# Patient Record
Sex: MALE | Race: ASIAN
[De-identification: names, ages, dates, MRNs, and addresses within clinical notes are randomized per-mention and may not be internally consistent; named-entity substitution may affect disease eponyms.]

---

## 2019-02-11 ENCOUNTER — HOSPITAL ENCOUNTER (OUTPATIENT)
Dept: HOSPITAL 76 - SC | Age: 34
Discharge: HOME | End: 2019-02-11
Attending: INTERNAL MEDICINE
Payer: COMMERCIAL

## 2019-02-11 DIAGNOSIS — R06.83: ICD-10-CM

## 2019-02-11 DIAGNOSIS — G47.10: Primary | ICD-10-CM

## 2019-02-11 DIAGNOSIS — G47.8: ICD-10-CM

## 2019-02-11 PROCEDURE — 99212 OFFICE O/P EST SF 10 MIN: CPT

## 2019-02-11 PROCEDURE — 99203 OFFICE O/P NEW LOW 30 MIN: CPT

## 2019-02-24 ENCOUNTER — HOSPITAL ENCOUNTER (OUTPATIENT)
Dept: HOSPITAL 76 - SC | Age: 34
Discharge: HOME | End: 2019-02-24
Attending: INTERNAL MEDICINE
Payer: COMMERCIAL

## 2019-02-24 DIAGNOSIS — R06.83: Primary | ICD-10-CM

## 2019-02-24 DIAGNOSIS — G47.10: ICD-10-CM

## 2019-02-24 PROCEDURE — 95810 POLYSOM 6/> YRS 4/> PARAM: CPT

## 2019-03-26 ENCOUNTER — HOSPITAL ENCOUNTER (OUTPATIENT)
Dept: HOSPITAL 76 - SC | Age: 34
Discharge: HOME | End: 2019-03-26
Attending: NURSE PRACTITIONER
Payer: COMMERCIAL

## 2019-03-26 DIAGNOSIS — R06.83: Primary | ICD-10-CM

## 2019-03-26 DIAGNOSIS — G47.10: ICD-10-CM

## 2019-03-26 PROCEDURE — 99213 OFFICE O/P EST LOW 20 MIN: CPT

## 2019-03-26 PROCEDURE — 99212 OFFICE O/P EST SF 10 MIN: CPT

## 2024-02-23 DIAGNOSIS — H93.19 TINNITUS, UNSPECIFIED EAR: Primary | ICD-10-CM

## 2024-03-14 ENCOUNTER — OFFICE VISIT (OUTPATIENT)
Dept: OTOLARYNGOLOGY | Facility: CLINIC | Age: 39
End: 2024-03-14
Payer: OTHER GOVERNMENT

## 2024-03-14 ENCOUNTER — CLINICAL SUPPORT (OUTPATIENT)
Dept: AUDIOLOGY | Facility: CLINIC | Age: 39
End: 2024-03-14
Payer: OTHER GOVERNMENT

## 2024-03-14 VITALS — HEIGHT: 72 IN | BODY MASS INDEX: 24.79 KG/M2 | WEIGHT: 183 LBS

## 2024-03-14 DIAGNOSIS — M26.609 TEMPOROMANDIBULAR JOINT DISORDER (TMJ): ICD-10-CM

## 2024-03-14 DIAGNOSIS — H90.3 SENSORINEURAL HEARING LOSS (SNHL) OF BOTH EARS: ICD-10-CM

## 2024-03-14 DIAGNOSIS — H93.13 SUBJECTIVE TINNITUS, BILATERAL: Primary | ICD-10-CM

## 2024-03-14 DIAGNOSIS — M26.621 ARTHRALGIA OF RIGHT TEMPOROMANDIBULAR JOINT: ICD-10-CM

## 2024-03-14 DIAGNOSIS — H93.19 TINNITUS, UNSPECIFIED LATERALITY: Primary | ICD-10-CM

## 2024-03-14 PROCEDURE — 99999PBSHW PR PBB SHADOW TECHNICAL ONLY FILED TO HB: Mod: PBBFAC,,,

## 2024-03-14 PROCEDURE — 92552 PURE TONE AUDIOMETRY AIR: CPT | Mod: PBBFAC | Performed by: AUDIOLOGIST

## 2024-03-14 PROCEDURE — 92555 SPEECH THRESHOLD AUDIOMETRY: CPT | Mod: PBBFAC | Performed by: AUDIOLOGIST

## 2024-03-14 PROCEDURE — 99213 OFFICE O/P EST LOW 20 MIN: CPT | Mod: PBBFAC,25 | Performed by: OTOLARYNGOLOGY

## 2024-03-14 PROCEDURE — 99204 OFFICE O/P NEW MOD 45 MIN: CPT | Mod: S$PBB,,, | Performed by: OTOLARYNGOLOGY

## 2024-03-14 PROCEDURE — 99202 OFFICE O/P NEW SF 15 MIN: CPT | Mod: PBBFAC | Performed by: AUDIOLOGIST

## 2024-03-14 RX ORDER — METHYLPREDNISOLONE 4 MG/1
TABLET ORAL
Qty: 1 EACH | Refills: 0 | Status: SHIPPED | OUTPATIENT
Start: 2024-03-14

## 2024-03-14 NOTE — PROGRESS NOTES
Subjective:       Patient ID: Jack Bobo is a 38 y.o. male.    Chief Complaint: Tinnitus (Bilateral ears. Loud, solid ring constantly for over a year. Hearing test done. ) and Sinusitis (Pt states he has anosmia some days.)    Ringing in Ears:    Associated symptoms: Ear pain and tinnitus.    Sinusitis  Associated symptoms include ear pain.     Review of Systems   HENT:  Positive for ear pain and tinnitus.    All other systems reviewed and are negative.      Objective:      Physical Exam  General: NAD TMJ tender limitied mouth excursion   Head: Normocephalic, atraumatic, no facial asymmetry/normal strength,  Ears: Both auricules normal in appearance, w/o deformities tympanic membranes normal external auditory canals normal  Nose: External nose w/o deformities normal turbinates no drainage or inflammation  Oral Cavity: Lips, gums, floor of mouth, tongue hard palate, and buccal mucosa without mass/lesion  Oropharynx: Mucosa pink and moist, soft palate, posterior pharynx and oropharyngeal wall without mass/lesion  Neck: Supple, symmetric, trachea midline, no palpable mass/lesion, no palpable cervical lymphadenopathy  Skin: Warm and dry, no concerning lesions  Respiratory: Respirations even, unlabored  Assessment:       1. Tinnitus, unspecified laterality    2. Sensorineural hearing loss (SNHL) of both ears    3. Arthralgia of right temporomandibular joint        Plan:       Audio reviewed and interpreted to pt mild SNHL left worse   Medrol for TMJ pain   See oral surgeon   F/u 3 weeks or prn if worse

## 2024-11-19 ENCOUNTER — ANESTHESIA (OUTPATIENT)
Dept: SURGERY | Facility: HOSPITAL | Age: 39
End: 2024-11-19
Payer: OTHER GOVERNMENT

## 2024-11-19 ENCOUNTER — ANESTHESIA EVENT (OUTPATIENT)
Dept: SURGERY | Facility: HOSPITAL | Age: 39
End: 2024-11-19
Payer: OTHER GOVERNMENT

## 2024-11-19 ENCOUNTER — HOSPITAL ENCOUNTER (OUTPATIENT)
Facility: HOSPITAL | Age: 39
Discharge: HOME | End: 2024-11-20
Attending: FAMILY MEDICINE | Admitting: UROLOGY
Payer: OTHER GOVERNMENT

## 2024-11-19 DIAGNOSIS — N20.0 RENAL STONE: ICD-10-CM

## 2024-11-19 DIAGNOSIS — N13.2 URETERAL STONE WITH HYDRONEPHROSIS: Primary | ICD-10-CM

## 2024-11-19 PROBLEM — N13.30 HYDRONEPHROSIS OF LEFT KIDNEY: Status: ACTIVE | Noted: 2024-11-19

## 2024-11-19 PROBLEM — N23 RENAL COLIC ON LEFT SIDE: Status: ACTIVE | Noted: 2024-11-19

## 2024-11-19 LAB
BILIRUB UR QL STRIP: NEGATIVE
CLARITY UR: CLEAR
COLOR UR: ABNORMAL
GLUCOSE SERPL-MCNC: 74 MG/DL (ref 70–105)
GLUCOSE UR STRIP-MCNC: NORMAL MG/DL
KETONES UR STRIP-SCNC: NEGATIVE MG/DL
LEUKOCYTE ESTERASE UR QL STRIP: NEGATIVE
NITRITE UR QL STRIP: NEGATIVE
PH UR STRIP: 6 PH UNITS
PROT UR QL STRIP: NEGATIVE
RBC # UR STRIP: ABNORMAL /UL
RBC #/AREA URNS HPF: 18 /HPF
SP GR UR STRIP: 1.02
UROBILINOGEN UR STRIP-ACNC: NORMAL MG/DL
WBC #/AREA URNS HPF: 8 /HPF

## 2024-11-19 PROCEDURE — 63600175 PHARM REV CODE 636 W HCPCS: Performed by: NURSE ANESTHETIST, CERTIFIED REGISTERED

## 2024-11-19 PROCEDURE — 37000009 HC ANESTHESIA EA ADD 15 MINS: Performed by: UROLOGY

## 2024-11-19 PROCEDURE — 27000177 HC AIRWAY, LARYNGEAL MASK: Performed by: NURSE ANESTHETIST, CERTIFIED REGISTERED

## 2024-11-19 PROCEDURE — D9220A PRA ANESTHESIA: Mod: ANES,,, | Performed by: ANESTHESIOLOGY

## 2024-11-19 PROCEDURE — 71000033 HC RECOVERY, INTIAL HOUR: Performed by: UROLOGY

## 2024-11-19 PROCEDURE — 25000003 PHARM REV CODE 250: Performed by: NURSE ANESTHETIST, CERTIFIED REGISTERED

## 2024-11-19 PROCEDURE — 27000510 HC BLANKET BAIR HUGGER ANY SIZE: Performed by: NURSE ANESTHETIST, CERTIFIED REGISTERED

## 2024-11-19 PROCEDURE — 99900035 HC TECH TIME PER 15 MIN (STAT)

## 2024-11-19 PROCEDURE — 74420 UROGRAPHY RTRGR +-KUB: CPT | Mod: 26,,, | Performed by: UROLOGY

## 2024-11-19 PROCEDURE — 82962 GLUCOSE BLOOD TEST: CPT

## 2024-11-19 PROCEDURE — 99223 1ST HOSP IP/OBS HIGH 75: CPT | Mod: 25,,, | Performed by: UROLOGY

## 2024-11-19 PROCEDURE — 36000706: Performed by: UROLOGY

## 2024-11-19 PROCEDURE — G0378 HOSPITAL OBSERVATION PER HR: HCPCS

## 2024-11-19 PROCEDURE — 37000008 HC ANESTHESIA 1ST 15 MINUTES: Performed by: UROLOGY

## 2024-11-19 PROCEDURE — 94799 UNLISTED PULMONARY SVC/PX: CPT

## 2024-11-19 PROCEDURE — 36000707: Performed by: UROLOGY

## 2024-11-19 PROCEDURE — 99285 EMERGENCY DEPT VISIT HI MDM: CPT | Mod: 25

## 2024-11-19 PROCEDURE — 81003 URINALYSIS AUTO W/O SCOPE: CPT | Performed by: FAMILY MEDICINE

## 2024-11-19 PROCEDURE — C1769 GUIDE WIRE: HCPCS | Performed by: UROLOGY

## 2024-11-19 PROCEDURE — 25000003 PHARM REV CODE 250: Performed by: FAMILY MEDICINE

## 2024-11-19 PROCEDURE — 94761 N-INVAS EAR/PLS OXIMETRY MLT: CPT

## 2024-11-19 PROCEDURE — 25000003 PHARM REV CODE 250: Performed by: UROLOGY

## 2024-11-19 PROCEDURE — C1758 CATHETER, URETERAL: HCPCS | Performed by: UROLOGY

## 2024-11-19 PROCEDURE — 52356 CYSTO/URETERO W/LITHOTRIPSY: CPT | Mod: LT,,, | Performed by: UROLOGY

## 2024-11-19 PROCEDURE — C2617 STENT, NON-COR, TEM W/O DEL: HCPCS | Performed by: UROLOGY

## 2024-11-19 PROCEDURE — 96374 THER/PROPH/DIAG INJ IV PUSH: CPT | Mod: 59

## 2024-11-19 PROCEDURE — D9220A PRA ANESTHESIA: Mod: CRNA,,, | Performed by: NURSE ANESTHETIST, CERTIFIED REGISTERED

## 2024-11-19 PROCEDURE — 63600175 PHARM REV CODE 636 W HCPCS: Performed by: UROLOGY

## 2024-11-19 PROCEDURE — 96375 TX/PRO/DX INJ NEW DRUG ADDON: CPT | Mod: 59

## 2024-11-19 PROCEDURE — 63600175 PHARM REV CODE 636 W HCPCS: Performed by: FAMILY MEDICINE

## 2024-11-19 PROCEDURE — 27000716 HC OXISENSOR PROBE, ANY SIZE: Performed by: NURSE ANESTHETIST, CERTIFIED REGISTERED

## 2024-11-19 PROCEDURE — 27201423 OPTIME MED/SURG SUP & DEVICES STERILE SUPPLY: Performed by: UROLOGY

## 2024-11-19 PROCEDURE — 96361 HYDRATE IV INFUSION ADD-ON: CPT

## 2024-11-19 DEVICE — VARIABLE LENGTH URETERAL STENT
Type: IMPLANTABLE DEVICE | Site: URETER | Status: FUNCTIONAL
Brand: CONTOUR VL™

## 2024-11-19 RX ORDER — MORPHINE SULFATE 10 MG/ML
4 INJECTION INTRAMUSCULAR; INTRAVENOUS; SUBCUTANEOUS EVERY 5 MIN PRN
Status: DISCONTINUED | OUTPATIENT
Start: 2024-11-19 | End: 2024-11-19 | Stop reason: HOSPADM

## 2024-11-19 RX ORDER — DEXAMETHASONE SODIUM PHOSPHATE 4 MG/ML
INJECTION, SOLUTION INTRA-ARTICULAR; INTRALESIONAL; INTRAMUSCULAR; INTRAVENOUS; SOFT TISSUE
Status: DISCONTINUED | OUTPATIENT
Start: 2024-11-19 | End: 2024-11-19

## 2024-11-19 RX ORDER — ONDANSETRON HYDROCHLORIDE 2 MG/ML
4 INJECTION, SOLUTION INTRAVENOUS DAILY PRN
Status: DISCONTINUED | OUTPATIENT
Start: 2024-11-19 | End: 2024-11-19 | Stop reason: HOSPADM

## 2024-11-19 RX ORDER — ACETAMINOPHEN 325 MG/1
650 TABLET ORAL EVERY 6 HOURS PRN
Status: DISCONTINUED | OUTPATIENT
Start: 2024-11-19 | End: 2024-11-20 | Stop reason: HOSPADM

## 2024-11-19 RX ORDER — ZINC GLUCONATE 50 MG
50 TABLET ORAL DAILY
COMMUNITY

## 2024-11-19 RX ORDER — DIPHENHYDRAMINE HYDROCHLORIDE 50 MG/ML
25 INJECTION INTRAMUSCULAR; INTRAVENOUS EVERY 6 HOURS PRN
Status: DISCONTINUED | OUTPATIENT
Start: 2024-11-19 | End: 2024-11-19 | Stop reason: HOSPADM

## 2024-11-19 RX ORDER — TAMSULOSIN HYDROCHLORIDE 0.4 MG/1
0.4 CAPSULE ORAL DAILY
Status: DISCONTINUED | OUTPATIENT
Start: 2024-11-19 | End: 2024-11-20 | Stop reason: HOSPADM

## 2024-11-19 RX ORDER — ONDANSETRON HYDROCHLORIDE 2 MG/ML
INJECTION, SOLUTION INTRAVENOUS
Status: DISCONTINUED | OUTPATIENT
Start: 2024-11-19 | End: 2024-11-19

## 2024-11-19 RX ORDER — SILDENAFIL 25 MG/1
25 TABLET, FILM COATED ORAL
COMMUNITY
Start: 2024-06-05

## 2024-11-19 RX ORDER — HYDROMORPHONE HYDROCHLORIDE 2 MG/ML
1 INJECTION, SOLUTION INTRAMUSCULAR; INTRAVENOUS; SUBCUTANEOUS EVERY 6 HOURS PRN
Status: DISCONTINUED | OUTPATIENT
Start: 2024-11-19 | End: 2024-11-20 | Stop reason: HOSPADM

## 2024-11-19 RX ORDER — MEPERIDINE HYDROCHLORIDE 25 MG/ML
25 INJECTION INTRAMUSCULAR; INTRAVENOUS; SUBCUTANEOUS EVERY 10 MIN PRN
Status: DISCONTINUED | OUTPATIENT
Start: 2024-11-19 | End: 2024-11-19 | Stop reason: HOSPADM

## 2024-11-19 RX ORDER — VIT C/E/ZN/COPPR/LUTEIN/ZEAXAN 250MG-90MG
500 CAPSULE ORAL DAILY
COMMUNITY

## 2024-11-19 RX ORDER — KETOROLAC TROMETHAMINE 30 MG/ML
30 INJECTION, SOLUTION INTRAMUSCULAR; INTRAVENOUS
Status: COMPLETED | OUTPATIENT
Start: 2024-11-19 | End: 2024-11-19

## 2024-11-19 RX ORDER — ONDANSETRON HYDROCHLORIDE 2 MG/ML
4 INJECTION, SOLUTION INTRAVENOUS
Status: ACTIVE | OUTPATIENT
Start: 2024-11-19 | End: 2024-11-19

## 2024-11-19 RX ORDER — LIDOCAINE HYDROCHLORIDE 20 MG/ML
INJECTION, SOLUTION EPIDURAL; INFILTRATION; INTRACAUDAL; PERINEURAL
Status: DISCONTINUED | OUTPATIENT
Start: 2024-11-19 | End: 2024-11-19

## 2024-11-19 RX ORDER — MIRTAZAPINE 7.5 MG/1
7.5 TABLET, FILM COATED ORAL NIGHTLY
COMMUNITY
Start: 2024-11-05

## 2024-11-19 RX ORDER — PYRIDOXINE HCL (VITAMIN B6) 25 MG
25 TABLET ORAL DAILY
COMMUNITY

## 2024-11-19 RX ORDER — PROPOFOL 10 MG/ML
VIAL (ML) INTRAVENOUS
Status: DISCONTINUED | OUTPATIENT
Start: 2024-11-19 | End: 2024-11-19

## 2024-11-19 RX ORDER — MELATONIN 5 MG
1 CAPSULE ORAL NIGHTLY PRN
COMMUNITY

## 2024-11-19 RX ORDER — CEFAZOLIN SODIUM 1 G/3ML
INJECTION, POWDER, FOR SOLUTION INTRAMUSCULAR; INTRAVENOUS
Status: DISCONTINUED | OUTPATIENT
Start: 2024-11-19 | End: 2024-11-19

## 2024-11-19 RX ORDER — ONDANSETRON 4 MG/1
4 TABLET, ORALLY DISINTEGRATING ORAL EVERY 8 HOURS PRN
Status: DISCONTINUED | OUTPATIENT
Start: 2024-11-19 | End: 2024-11-20 | Stop reason: HOSPADM

## 2024-11-19 RX ORDER — IPRATROPIUM BROMIDE AND ALBUTEROL SULFATE 2.5; .5 MG/3ML; MG/3ML
3 SOLUTION RESPIRATORY (INHALATION) ONCE
Status: DISCONTINUED | OUTPATIENT
Start: 2024-11-19 | End: 2024-11-19 | Stop reason: HOSPADM

## 2024-11-19 RX ORDER — FENTANYL CITRATE 50 UG/ML
INJECTION, SOLUTION INTRAMUSCULAR; INTRAVENOUS
Status: DISCONTINUED | OUTPATIENT
Start: 2024-11-19 | End: 2024-11-19

## 2024-11-19 RX ORDER — PHENAZOPYRIDINE HYDROCHLORIDE 100 MG/1
200 TABLET, FILM COATED ORAL
Status: DISCONTINUED | OUTPATIENT
Start: 2024-11-19 | End: 2024-11-20 | Stop reason: HOSPADM

## 2024-11-19 RX ORDER — OXYCODONE AND ACETAMINOPHEN 5; 325 MG/1; MG/1
1 TABLET ORAL EVERY 4 HOURS PRN
Status: DISCONTINUED | OUTPATIENT
Start: 2024-11-19 | End: 2024-11-20 | Stop reason: HOSPADM

## 2024-11-19 RX ORDER — DOCUSATE SODIUM 100 MG
600 CAPSULE ORAL
Status: DISCONTINUED | OUTPATIENT
Start: 2024-11-19 | End: 2024-11-20 | Stop reason: HOSPADM

## 2024-11-19 RX ORDER — ONDANSETRON HYDROCHLORIDE 2 MG/ML
4 INJECTION, SOLUTION INTRAVENOUS
Status: COMPLETED | OUTPATIENT
Start: 2024-11-19 | End: 2024-11-19

## 2024-11-19 RX ORDER — ZOLPIDEM TARTRATE 5 MG/1
10 TABLET ORAL NIGHTLY PRN
Status: DISCONTINUED | OUTPATIENT
Start: 2024-11-19 | End: 2024-11-20 | Stop reason: HOSPADM

## 2024-11-19 RX ORDER — SUMATRIPTAN SUCCINATE 25 MG/1
25 TABLET ORAL
COMMUNITY
Start: 2024-06-05

## 2024-11-19 RX ORDER — CEFTRIAXONE 1 G/1
2 INJECTION, POWDER, FOR SOLUTION INTRAMUSCULAR; INTRAVENOUS ONCE
Status: COMPLETED | OUTPATIENT
Start: 2024-11-19 | End: 2024-11-19

## 2024-11-19 RX ORDER — HYDROMORPHONE HYDROCHLORIDE 2 MG/ML
0.5 INJECTION, SOLUTION INTRAMUSCULAR; INTRAVENOUS; SUBCUTANEOUS EVERY 5 MIN PRN
Status: DISCONTINUED | OUTPATIENT
Start: 2024-11-19 | End: 2024-11-19 | Stop reason: HOSPADM

## 2024-11-19 RX ADMIN — PHENAZOPYRIDINE 200 MG: 100 TABLET ORAL at 10:11

## 2024-11-19 RX ADMIN — DEXAMETHASONE SODIUM PHOSPHATE 8 MG: 4 INJECTION, SOLUTION INTRA-ARTICULAR; INTRALESIONAL; INTRAMUSCULAR; INTRAVENOUS; SOFT TISSUE at 08:11

## 2024-11-19 RX ADMIN — Medication 600 ML: at 08:11

## 2024-11-19 RX ADMIN — ONDANSETRON 4 MG: 2 INJECTION INTRAMUSCULAR; INTRAVENOUS at 08:11

## 2024-11-19 RX ADMIN — KETOROLAC TROMETHAMINE 30 MG: 30 INJECTION, SOLUTION INTRAMUSCULAR at 08:11

## 2024-11-19 RX ADMIN — TAMSULOSIN HYDROCHLORIDE 0.4 MG: 0.4 CAPSULE ORAL at 10:11

## 2024-11-19 RX ADMIN — CEFAZOLIN 2 G: 1 INJECTION, POWDER, FOR SOLUTION INTRAMUSCULAR; INTRAVENOUS; PARENTERAL at 08:11

## 2024-11-19 RX ADMIN — CEFTRIAXONE SODIUM 2 G: 1 INJECTION, POWDER, FOR SOLUTION INTRAMUSCULAR; INTRAVENOUS at 11:11

## 2024-11-19 RX ADMIN — LIDOCAINE HYDROCHLORIDE 80 MG: 20 INJECTION, SOLUTION EPIDURAL; INFILTRATION; INTRACAUDAL; PERINEURAL at 08:11

## 2024-11-19 RX ADMIN — PROPOFOL 180 MG: 10 INJECTION, EMULSION INTRAVENOUS at 08:11

## 2024-11-19 RX ADMIN — FENTANYL CITRATE 100 MCG: 50 INJECTION, SOLUTION INTRAMUSCULAR; INTRAVENOUS at 08:11

## 2024-11-19 RX ADMIN — SODIUM CHLORIDE: 9 INJECTION, SOLUTION INTRAVENOUS at 08:11

## 2024-11-19 RX ADMIN — SODIUM CHLORIDE 1000 ML: 9 INJECTION, SOLUTION INTRAVENOUS at 09:11

## 2024-11-19 NOTE — HPI
The patient is a 39-year-old male that presents to the emergency room with severe left-sided flank pain associated with nausea and retching.  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  He reports associated nausea without vomiting.  A comprehensive examination was performed in the emergency room which demonstrates a left distal ureteral stone 7 mm in side with secondary signs of obstruction.  The patient reports that his pain is improved with IV pain medicines.

## 2024-11-19 NOTE — CONSULTS
[facility] - Emergency Department  Urology  Consult Note    Patient Name: Jack Bobo  MRN: [id number]  Admission Date: 11/19/2024  Hospital Length of Stay: 0   Code Status: No Order   Attending Provider: Godwin Sandoval MD   Consulting Provider: GODWIN SANDOVAL MD  Primary Care Physician: No, Primary Doctor  Principal Problem:Ureteral stone with hydronephrosis    Inpatient consult to Urology  Consult performed by: Godwin Sandoval MD  Consult ordered by: Ti Joe DO  Reason for consult: Intractable renal colic  Assessment/Recommendations: I personally reviewed the CT scan of the abdomen and pelvis and there is a 7 mm stone in the distal left ureter with secondary signs of obstruction.  The patient reports the onset of symptoms about a week ago with urgency frequency and hematuria and reports that his pain progressed and he is requiring IV therapy.  We discussed plans for left ureteroscopy with holmium laser lithotripsy, left ureteral stent placement.  We discussed risks of procedure, indications and expected outcome.  Keep NPO plan for cystoscopy with left ureteroscopy, left laser lithotripsy, left ureteral stent placement.           Subjective:     HPI:  The patient is a 39-year-old male that presents to the emergency room with severe left-sided flank pain associated with nausea and retching.  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  He reports associated nausea without vomiting.  A comprehensive examination was performed in the emergency room which demonstrates a left distal ureteral stone 7 mm in side with secondary signs of obstruction.  The patient reports that his pain is improved with IV pain medicines.     No past medical history on file.    No past surgical history on  file.    Review of patient's allergies indicates:  No Known Allergies    Family History    None         Tobacco Use    Smoking status: Not on file    Smokeless tobacco: Not on file   Substance and Sexual Activity    Alcohol use: Not on file    Drug use: Not on file    Sexual activity: Not on file       Review of Systems   Constitutional:  Positive for activity change. Negative for fever.   HENT:  Negative for congestion.    Eyes:  Negative for visual disturbance.   Respiratory:  Negative for shortness of breath.    Cardiovascular:  Negative for chest pain.   Gastrointestinal:  Positive for abdominal pain.   Genitourinary:  Positive for flank pain, frequency, hematuria and urgency.   Musculoskeletal:  Positive for back pain.   Neurological:  Negative for weakness.   Hematological:  Does not bruise/bleed easily.   Psychiatric/Behavioral:  Positive for sleep disturbance.        Objective:     Temp:  [98 °F (36.7 °C)] 98 °F (36.7 °C)  Pulse:  [57] 57  Resp:  [17] 17  SpO2:  [100 %] 100 %  BP: (147)/(89) 147/89  Weight: 84.8 kg (187 lb)  Body mass index is 25.36 kg/m².    Date 11/19/24 0700 - 11/20/24 0659   Shift 0354-2636 6200-2056 3283-7888 24 Hour Total   INTAKE   P.O. 600   600   Shift Total(mL/kg) 600(7.1)   600(7.1)   OUTPUT   Shift Total(mL/kg)       Weight (kg) 84.8 84.8 84.8 84.8          Drains       None                    Physical Exam  Vitals and nursing note reviewed. Exam conducted with a chaperone present.   Constitutional:       Appearance: He is not ill-appearing or diaphoretic.   Eyes:      General: No scleral icterus.  Cardiovascular:      Rate and Rhythm: Bradycardia present.   Pulmonary:      Effort: Pulmonary effort is normal. No respiratory distress.      Breath sounds: No wheezing.   Abdominal:      General: There is no distension.      Palpations: Abdomen is soft. There is no mass.      Tenderness: There is left CVA tenderness.   Skin:     General: Skin is warm.   Neurological:      General: No  "focal deficit present.      Mental Status: He is alert and oriented to person, place, and time.   Psychiatric:         Mood and Affect: Mood normal.         Behavior: Behavior normal.         Thought Content: Thought content normal.         Judgment: Judgment normal.          Significant Labs:    BMP:  No results for input(s): "NA", "K", "CL", "CO2", "BUN", "CREATININE", "LABGLOM", "GLUCOSE", "CALCIUM" in the last 168 hours.    CBC:  No results for input(s): "WBC", "HGB", "HCT", "PLT" in the last 168 hours.    All pertinent labs results from the past 24 hours have been reviewed.    Significant Imaging:  All pertinent imaging results/findings from the past 24 hours have been reviewed.                    Assessment and Plan:     * Ureteral stone with hydronephrosis       Hydronephrosis of left kidney       Renal colic on left side           VTE Risk Mitigation (From admission, onward)      None            Thank you for your consult.     ANGIE SANDOVAL MD  Urology  [facility] - Emergency Department  "

## 2024-11-19 NOTE — SUBJECTIVE & OBJECTIVE
No past medical history on file.    No past surgical history on file.    Review of patient's allergies indicates:  No Known Allergies    Family History    None         Tobacco Use    Smoking status: Not on file    Smokeless tobacco: Not on file   Substance and Sexual Activity    Alcohol use: Not on file    Drug use: Not on file    Sexual activity: Not on file       Review of Systems   Constitutional:  Positive for activity change. Negative for fever.   HENT:  Negative for congestion.    Eyes:  Negative for visual disturbance.   Respiratory:  Negative for shortness of breath.    Cardiovascular:  Negative for chest pain.   Gastrointestinal:  Positive for abdominal pain.   Genitourinary:  Positive for flank pain, frequency, hematuria and urgency.   Musculoskeletal:  Positive for back pain.   Neurological:  Negative for weakness.   Hematological:  Does not bruise/bleed easily.   Psychiatric/Behavioral:  Positive for sleep disturbance.        Objective:     Temp:  [98 °F (36.7 °C)] 98 °F (36.7 °C)  Pulse:  [57] 57  Resp:  [17] 17  SpO2:  [100 %] 100 %  BP: (147)/(89) 147/89  Weight: 84.8 kg (187 lb)  Body mass index is 25.36 kg/m².    Date 11/19/24 0700 - 11/20/24 0659   Shift 7065-6009 0232-0531 0422-5561 24 Hour Total   INTAKE   P.O. 600   600   Shift Total(mL/kg) 600(7.1)   600(7.1)   OUTPUT   Shift Total(mL/kg)       Weight (kg) 84.8 84.8 84.8 84.8          Drains       None                    Physical Exam  Vitals and nursing note reviewed. Exam conducted with a chaperone present.   Constitutional:       Appearance: He is not ill-appearing or diaphoretic.   Eyes:      General: No scleral icterus.  Cardiovascular:      Rate and Rhythm: Bradycardia present.   Pulmonary:      Effort: Pulmonary effort is normal. No respiratory distress.      Breath sounds: No wheezing.   Abdominal:      General: There is no distension.      Palpations: Abdomen is soft. There is no mass.      Tenderness: There is left CVA tenderness.  "  Skin:     General: Skin is warm.   Neurological:      General: No focal deficit present.      Mental Status: He is alert and oriented to person, place, and time.   Psychiatric:         Mood and Affect: Mood normal.         Behavior: Behavior normal.         Thought Content: Thought content normal.         Judgment: Judgment normal.          Significant Labs:    BMP:  No results for input(s): "NA", "K", "CL", "CO2", "BUN", "CREATININE", "LABGLOM", "GLUCOSE", "CALCIUM" in the last 168 hours.    CBC:  No results for input(s): "WBC", "HGB", "HCT", "PLT" in the last 168 hours.    All pertinent labs results from the past 24 hours have been reviewed.    Significant Imaging:  All pertinent imaging results/findings from the past 24 hours have been reviewed.                  "

## 2024-11-19 NOTE — PHARMACY MED REC
"Admission Medication History     The home medication history was taken by Constance Burks.    You may go to "Admission" then "Reconcile Home Medications" tabs to review and/or act upon these items.     The home medication list has been updated by the Pharmacy department.   Please read ALL comments highlighted in yellow.   Please address this information as you see fit.    Feel free to contact us if you have any questions or require assistance.  Medications Added:  Mirtazapine 7.5 mg  Sumatriptan 25 mg  Sildenafil 25 mg  Vitamin B12  Vitamin B6  Zinc  Melatonin  Patient was recently prescribed Amoxicillin 875 mg but only took it twice before stopping as his throat stopped hurting.  Patient has also been prescribed Zolpidem but hasn't started taking it yet because the Melatonin has been helping.      Patient reports no longer taking the following medication(s). The medication(s) listed below were removed from the home medication list. Please reorder if appropriate:  Methylprednisolone 4 mg    Medications listed below were obtained from: Patient/family and Analytic software- [employer]  (Not in a hospital admission)        Current Outpatient Medications on File Prior to Encounter   Medication Sig Dispense Refill Last Dose/Taking    cyanocobalamin (VITAMIN B-12) 500 MCG tablet Take 500 mcg by mouth once daily.   11/18/2024    melatonin 5 mg Cap Take 1 capsule by mouth nightly as needed.   Past Week    mirtazapine (REMERON) 7.5 MG Tab Take 7.5 mg by mouth every evening.   11/17/2024    pyridoxine, vitamin B6, (VITAMIN B-6) 25 MG Tab Take 25 mg by mouth once daily.   11/18/2024    sildenafiL (VIAGRA) 25 MG tablet Take 25 mg by mouth.   Taking    sumatriptan (IMITREX) 25 MG Tab Take 25 mg by mouth.   Past Week    zinc gluconate 50 mg tablet Take 50 mg by mouth once daily.   11/18/2024    [DISCONTINUED] methylPREDNISolone (MEDROL DOSEPACK) 4 mg tablet use as directed 1 each 0          Potential issues to be addressed PRIOR TO " DISCHARGE  No issues identified.    Constance Burks  Medication Access Specialist  EXT. [phone]    .

## 2024-11-19 NOTE — ANESTHESIA PREPROCEDURE EVALUATION
"                                                                                                             11/19/2024  Jack Bobo is a 39 y.o., male.      Pre-op Assessment    I have reviewed the Patient Summary Reports.     I have reviewed the Nursing Notes. I have reviewed the NPO Status.   I have reviewed the Medications.     Review of Systems  Anesthesia Hx:             Denies Family Hx of Anesthesia complications.    Denies Personal Hx of Anesthesia complications.                    Social:  Non-Smoker, No Alcohol Use       Renal/:   renal calculi               Musculoskeletal:  Musculoskeletal Normal                    Physical Exam  General: Well nourished, Cooperative, Alert and Oriented    Airway:  Mallampati: II / II  Mouth Opening: Normal  TM Distance: Normal  Neck ROM: Normal ROM    Dental:  Intact    Chest/Lungs:  Clear to auscultation    Heart:  Rate: Normal  Rhythm: Regular Rhythm  Sounds: Normal        Chemistry    No results found for: "NA", "K", "CL", "CO2", "BUN", "CREATININE", "GLU" No results found for: "CALCIUM", "ALKPHOS", "AST", "ALT", "BILITOT", "ESTGFRAFRICA", "EGFRNONAA"     No results found for: "WBC", "HGB", "HCT", "PLT"  No results found for this or any previous visit.      Anesthesia Plan  Type of Anesthesia, risks & benefits discussed:    Anesthesia Type: Gen ETT  Intra-op Monitoring Plan: Standard ASA Monitors  Post Op Pain Control Plan: multimodal analgesia  Induction:  IV  Airway Plan: Direct  Informed Consent: Informed consent signed with the Patient and all parties understand the risks and agree with anesthesia plan.  All questions answered.   ASA Score: 1  Day of Surgery Review of History & Physical: H&P Update referred to the surgeon/provider.I have interviewed and examined the patient. I have reviewed the patient's H&P dated: There are no significant changes.     Ready For Surgery From Anesthesia Perspective.     .      "

## 2024-11-19 NOTE — ED PROVIDER NOTES
Encounter Date: 11/19/2024       History     Chief Complaint   Patient presents with    Abdominal Pain     S/s started this AM     Urinary Frequency     Patient comes in with pain in the left lower quadrant started this morning around 3:00 a.m..  He is having difficulty with urination.  CVA tenderness on the left.        Review of patient's allergies indicates:  No Known Allergies  History reviewed. No pertinent past medical history.  History reviewed. No pertinent surgical history.  No family history on file.     Review of Systems   Constitutional:  Positive for fatigue. Negative for fever.   HENT: Negative.  Negative for sore throat.    Eyes: Negative.    Respiratory: Negative.  Negative for shortness of breath.    Cardiovascular: Negative.  Negative for chest pain.   Gastrointestinal: Negative.  Negative for nausea.   Endocrine: Negative.    Genitourinary: Negative.  Negative for dysuria.   Musculoskeletal: Negative.  Negative for back pain.   Skin: Negative.  Negative for rash.   Allergic/Immunologic: Negative.    Neurological: Negative.  Negative for weakness.   Hematological: Negative.  Does not bruise/bleed easily.   Psychiatric/Behavioral: Negative.         Physical Exam     Initial Vitals [11/19/24 0704]   BP Pulse Resp Temp SpO2   (!) 147/89 (!) 57 17 98 °F (36.7 °C) 100 %      MAP       --         Physical Exam    Constitutional: He appears well-developed and well-nourished.   HENT:   Head: Normocephalic and atraumatic.   Right Ear: External ear normal.   Left Ear: External ear normal.   Nose: Nose normal. Mouth/Throat: Oropharynx is clear and moist.   Eyes: Conjunctivae and EOM are normal. Pupils are equal, round, and reactive to light.   Neck: Neck supple.   Normal range of motion.  Cardiovascular:  Normal rate, regular rhythm, normal heart sounds and intact distal pulses.           Pulmonary/Chest: Breath sounds normal.   Abdominal: Abdomen is soft. Bowel sounds are normal.   Cva tenderness    Genitourinary:    Prostate and penis normal.     Musculoskeletal:         General: Normal range of motion.      Cervical back: Normal range of motion and neck supple.     Neurological: He is alert and oriented to person, place, and time. He has normal strength and normal reflexes.   Skin: Skin is warm and dry.   Psychiatric: He has a normal mood and affect. His behavior is normal. Judgment and thought content normal.         Medical Screening Exam   See Full Note    ED Course   Procedures  Labs Reviewed   URINALYSIS, REFLEX TO URINE CULTURE - Abnormal       Result Value    Color, UA Light-Yellow      Clarity, UA Clear      pH, UA 6.0      Leukocytes, UA Negative      Nitrites, UA Negative      Protein, UA Negative      Glucose, UA Normal      Ketones, UA Negative      Urobilinogen, UA Normal      Bilirubin, UA Negative      Blood, UA Moderate (*)     Specific [place], UA 1.016     URINALYSIS, MICROSCOPIC - Abnormal    WBC, UA 8 (*)     RBC, UA 18 (*)    POCT GLUCOSE MONITORING CONTINUOUS    POC Glucose 74     POCT GLUCOSE MONITORING CONTINUOUS          Imaging Results               CT Renal Stone Study ABD Pelvis WO (Final result)  Result time 11/19/24 08:49:46      Final result by Jai Alcala MD (11/19/24 08:49:46)                   Impression:      7 mm stone at the left ureterovesicular junction with mild obstructive uropathy.    Nonobstructing right-sided renal stones.    This report was flagged in Epic as abnormal.      Electronically signed by: Jai Alcala MD  Date:    11/19/2024  Time:    08:49               Narrative:    EXAMINATION:  CT RENAL STONE STUDY ABD PELVIS WO    CLINICAL HISTORY:  Nephrolithiasis, uncomplicated;    TECHNIQUE:  Low dose axial images, sagittal and coronal reformations were obtained from the lung bases to the pubic symphysis. Oral and IV contrast not administered.    COMPARISON:  None.    FINDINGS:  Lower chest: Heart size is normal. Lung bases are essentially clear. No  pleural or pericardial effusion.    Abdomen:    Evaluation of the solid abdominal organs and bowel is limited in the absence of IV contrast.    Liver is normal in size and contour without focal contour deforming lesion. Gallbladder is unremarkable. No calcified gallstones. No intra-or extrahepatic biliary ductal dilatation.    Spleen, adrenals, and pancreas are unremarkable.    Mild edematous appearance of the left kidney when compared to the right side.  At least 3 punctate nonobstructing right-sided renal stones measuring up to 5 mm in size.  No right-sided hydronephrosis.  Mild left hydroureteronephrosis to the level of a 7 mm stone in the left ureterovesicular junction (axial image 79).  No nonobstructing left-sided renal stones.  No significant perinephric fat stranding.  Mild left periureteral fat stranding.    No small bowel obstruction.  No inflammatory changes identified in bowel on this noncontrast study.    No abdominal free fluid or pneumoperitoneum.    Abdominal aorta is normal in course and caliber.    No bulky retroperitoneal lymphadenopathy.    Pelvis: Urinary bladder, rectum, and pelvic organs are unremarkable.  No free fluid in the pelvis.    Bones and soft tissues: No aggressive osseous lesions. Minor degenerative changes in the spine.  Extraperitoneal soft tissues are negative for acute finding.                                       Medications   electrolytes-dextrose (Pedialyte) oral solution 600 mL (600 mLs Oral Not Given 11/20/24 0500)   ondansetron injection 4 mg (0 mg Intravenous Hold 11/19/24 0945)   oxyCODONE-acetaminophen 5-325 mg per tablet 1 tablet (has no administration in time range)   tamsulosin 24 hr capsule 0.4 mg (0.4 mg Oral Given 11/19/24 2210)   phenazopyridine tablet 200 mg (200 mg Oral Given 11/19/24 2247)   ondansetron disintegrating tablet 4 mg (has no administration in time range)   zolpidem tablet 10 mg (has no administration in time range)   acetaminophen tablet 650 mg  (has no administration in time range)   HYDROmorphone (PF) injection 1 mg (has no administration in time range)   ketorolac injection 30 mg (30 mg Intravenous Given 11/19/24 0816)   ondansetron injection 4 mg (4 mg Intravenous Given 11/19/24 0816)   sodium chloride 0.9% bolus 1,000 mL 1,000 mL (0 mLs Intravenous Stopped 11/19/24 1520)   cefTRIAXone injection 2 g (2 g Intravenous Given 11/19/24 1155)     Medical Decision Making                        Medical Decision Making:   Initial Assessment:   Patient comes in with pain in the left lower quadrant started this morning around 3:00 a.m..  He is having difficulty with urination.  CVA tenderness on the left.      No history of having a renal stone  Differential Diagnosis:   Patient with obstructing 7 mm stone in the left at the UVJ  ED Management:  Discussed case with Dr. Cameron  will admit to arbs the patient will stay NPO and will possibly go to surgery for stent placement today--consult has been put in for Rakesh             Clinical Impression:   Final diagnoses:  [N20.0] Renal stone        ED Disposition Condition    Observation Stable                Ti Joe,   11/20/24 0905

## 2024-11-20 VITALS
TEMPERATURE: 98 F | RESPIRATION RATE: 20 BRPM | HEIGHT: 72 IN | SYSTOLIC BLOOD PRESSURE: 126 MMHG | HEART RATE: 65 BPM | WEIGHT: 180 LBS | DIASTOLIC BLOOD PRESSURE: 86 MMHG | OXYGEN SATURATION: 98 % | BODY MASS INDEX: 24.38 KG/M2

## 2024-11-20 DIAGNOSIS — Z96.0 URETERAL STENT PRESENT: Primary | ICD-10-CM

## 2024-11-20 PROBLEM — N23 RENAL COLIC ON LEFT SIDE: Status: RESOLVED | Noted: 2024-11-19 | Resolved: 2024-11-20

## 2024-11-20 PROBLEM — N13.30 HYDRONEPHROSIS OF LEFT KIDNEY: Status: RESOLVED | Noted: 2024-11-19 | Resolved: 2024-11-20

## 2024-11-20 PROBLEM — N13.2 URETERAL STONE WITH HYDRONEPHROSIS: Status: RESOLVED | Noted: 2024-11-19 | Resolved: 2024-11-20

## 2024-11-20 PROCEDURE — 99238 HOSP IP/OBS DSCHRG MGMT 30/<: CPT | Mod: ,,, | Performed by: UROLOGY

## 2024-11-20 PROCEDURE — G0378 HOSPITAL OBSERVATION PER HR: HCPCS

## 2024-11-20 RX ORDER — OXYCODONE AND ACETAMINOPHEN 5; 325 MG/1; MG/1
1 TABLET ORAL EVERY 6 HOURS PRN
Qty: 20 TABLET | Refills: 0 | Status: SHIPPED | OUTPATIENT
Start: 2024-11-20 | End: 2024-11-25

## 2024-11-20 RX ORDER — TAMSULOSIN HYDROCHLORIDE 0.4 MG/1
0.4 CAPSULE ORAL DAILY
Qty: 20 CAPSULE | Refills: 0 | Status: SHIPPED | OUTPATIENT
Start: 2024-11-20 | End: 2024-12-10

## 2024-11-20 RX ORDER — AMOXICILLIN 250 MG
2 CAPSULE ORAL DAILY
Qty: 60 TABLET | Refills: 0 | Status: SHIPPED | OUTPATIENT
Start: 2024-11-20 | End: 2024-12-20

## 2024-11-20 RX ORDER — ONDANSETRON 4 MG/1
4 TABLET, ORALLY DISINTEGRATING ORAL EVERY 12 HOURS PRN
Qty: 14 TABLET | Refills: 0 | Status: SHIPPED | OUTPATIENT
Start: 2024-11-20 | End: 2024-11-27

## 2024-11-20 RX ORDER — PHENAZOPYRIDINE HYDROCHLORIDE 200 MG/1
200 TABLET, FILM COATED ORAL
Qty: 9 TABLET | Refills: 0 | Status: SHIPPED | OUTPATIENT
Start: 2024-11-20 | End: 2024-11-23

## 2024-11-20 RX ORDER — AMOXICILLIN 250 MG
2 CAPSULE ORAL DAILY
Qty: 60 TABLET | Refills: 0 | Status: SHIPPED | OUTPATIENT
Start: 2024-11-20 | End: 2024-11-20 | Stop reason: SDUPTHER

## 2024-11-20 RX ORDER — IBUPROFEN 800 MG/1
TABLET ORAL
Qty: 15 TABLET | Refills: 0 | Status: SHIPPED | OUTPATIENT
Start: 2024-11-20

## 2024-11-20 NOTE — ANESTHESIA PROCEDURE NOTES
Intubation    Date/Time: 11/19/2024 8:35 PM    Performed by: Heri Salazar CRNA  Authorized by: Morris De La Torre MD    Intubation:     Induction:  Intravenous    Intubated:  Postinduction    Mask Ventilation:  Easy mask    Attempts:  1    Attempted By:  CRNA    Difficult Airway Encountered?: No      Complications:  None    Airway Device:  Supraglottic airway/LMA    Airway Device Size:  4.0    Style/Cuff Inflation:  Cuffed (inflated to minimal occlusive pressure)    Placement Verified By:  Capnometry    Complicating Factors:  None    Findings Post-Intubation:  BS equal bilateral and atraumatic/condition of teeth unchanged

## 2024-11-20 NOTE — TRANSFER OF CARE
"Anesthesia Transfer of Care Note    Patient: Jack Bobo    Procedure(s) Performed: Procedure(s) (LRB):  CYSTOURETEROSCOPY, WITH HOLMIUM LASER LITHOTRIPSY OF URETERAL CALCULUS AND STENT INSERTION (Left)    Patient location: PACU    Anesthesia Type: general    Transport from OR: Transported from OR on room air with adequate spontaneous ventilation    Post pain: adequate analgesia    Post assessment: no apparent anesthetic complications and tolerated procedure well    Post vital signs: stable    Level of consciousness: responds to stimulation    Nausea/Vomiting: no nausea/vomiting    Complications: none    Transfer of care protocol was followedComments: Report Given to PACU rn VSS      Last vitals: Visit Vitals  BP (!) 105/55 (BP Location: Right arm, Patient Position: Lying)   Pulse 77   Temp 36.4 °C (97.6 °F)   Resp (!) 24   Ht 6' 0.01" (1.829 m)   Wt 81.6 kg (180 lb)   SpO2 99%   BMI 24.41 kg/m²     "

## 2024-11-20 NOTE — H&P
[facility]   Urology  History and physical     Patient Name: Jack Bobo  MRN: [id number]  Admission Date: 11/19/2024  Hospital Length of Stay: 0   Code Status: No Order   Attending Provider: Godwin Sandoval MD   Consulting Provider: GODWIN SANDOVAL MD  Primary Care Physician: Ling, Primary Doctor  Principal Problem:Ureteral stone with hydronephrosis     Inpatient consult to Urology  Consult performed by: Godwin Sandoval MD  Consult ordered by: Ti Joe DO  Reason for consult: Intractable renal colic  Assessment/Recommendations: I personally reviewed the CT scan of the abdomen and pelvis and there is a 7 mm stone in the distal left ureter with secondary signs of obstruction.  The patient reports the onset of symptoms about a week ago with urgency frequency and hematuria and reports that his pain progressed and he is requiring IV therapy.  We discussed plans for left ureteroscopy with holmium laser lithotripsy, left ureteral stent placement.  We discussed risks of procedure, indications and expected outcome.  Keep NPO plan for cystoscopy with left ureteroscopy, left laser lithotripsy, left ureteral stent placement.               Subjective:      HPI:  The patient is a 39-year-old male that presents to the emergency room with severe left-sided flank pain associated with nausea and retching.  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  He reports associated nausea without vomiting.  A comprehensive examination was performed in the emergency room which demonstrates a left distal ureteral stone 7 mm in side with secondary signs of obstruction.  The patient reports that his pain is improved with IV pain medicines.      No past medical history on file.     No past surgical history on  file.     Review of patient's allergies indicates:  No Known Allergies     Family History    None                 Tobacco Use    Smoking status: Not on file    Smokeless tobacco: Not on file   Substance and Sexual Activity    Alcohol use: Not on file    Drug use: Not on file    Sexual activity: Not on file         Review of Systems   Constitutional:  Positive for activity change. Negative for fever.   HENT:  Negative for congestion.    Eyes:  Negative for visual disturbance.   Respiratory:  Negative for shortness of breath.    Cardiovascular:  Negative for chest pain.   Gastrointestinal:  Positive for abdominal pain.   Genitourinary:  Positive for flank pain, frequency, hematuria and urgency.   Musculoskeletal:  Positive for back pain.   Neurological:  Negative for weakness.   Hematological:  Does not bruise/bleed easily.   Psychiatric/Behavioral:  Positive for sleep disturbance.          Objective:      Temp:  [98 °F (36.7 °C)] 98 °F (36.7 °C)  Pulse:  [57] 57  Resp:  [17] 17  SpO2:  [100 %] 100 %  BP: (147)/(89) 147/89  Weight: 84.8 kg (187 lb)  Body mass index is 25.36 kg/m².            Date 11/19/24 0700 - 11/20/24 0659   Shift 5257-5014 5255-2310 2679-7717 24 Hour Total   INTAKE   P.O. 600     600   Shift Total(mL/kg) 600(7.1)     600(7.1)   OUTPUT   Shift Total(mL/kg)           Weight (kg) 84.8 84.8 84.8 84.8            Drains         None                         Physical Exam  Vitals and nursing note reviewed. Exam conducted with a chaperone present.   Constitutional:       Appearance: He is not ill-appearing or diaphoretic.   Eyes:      General: No scleral icterus.  Cardiovascular:      Rate and Rhythm: Bradycardia present.   Pulmonary:      Effort: Pulmonary effort is normal. No respiratory distress.      Breath sounds: No wheezing.   Abdominal:      General: There is no distension.      Palpations: Abdomen is soft. There is no mass.      Tenderness: There is left CVA tenderness.   Skin:     General: Skin  is warm.   Neurological:      General: No focal deficit present.      Mental Status: He is alert and oriented to person, place, and time.   Psychiatric:         Mood and Affect: Mood normal.         Behavior: Behavior normal.         Thought Content: Thought content normal.         Judgment: Judgment normal.               All pertinent labs results from the past 24 hours have been reviewed.     Significant Imaging:  All pertinent imaging results/findings from the past 24 hours have been reviewed.                             Assessment and Plan:      * Ureteral stone with hydronephrosis        Hydronephrosis of left kidney        Renal colic on left side              VTE Risk Mitigation (From admission, onward)        None                Thank you for your consult.      ANGIE SANDOVAL MD  Urology  [facility] - Emergency Department

## 2024-11-20 NOTE — DISCHARGE SUMMARY
[facility] - [facility]  Urology  Discharge Summary      Patient Name: Jack Bobo  MRN: [id number]  Admission Date: 11/19/2024  Hospital Length of Stay: 0 days  Discharge Date and Time:  11/20/2024 5:48 AM  Attending Physician: Godwin Sandoval MD   Discharging Provider: GODWIN SANDOVAL MD  Primary Care Physician: Ling, Primary Doctor    HPI:   The patient is a 39-year-old male that presents to the emergency room with severe left-sided flank pain associated with nausea and retching.  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  He reports associated nausea without vomiting.  A comprehensive examination was performed in the emergency room which demonstrates a left distal ureteral stone 7 mm in side with secondary signs of obstruction.  The patient reports that his pain is improved with IV pain medicines.     Procedure(s) (LRB):  CYSTOURETEROSCOPY, WITH HOLMIUM LASER LITHOTRIPSY OF URETERAL CALCULUS AND STENT INSERTION (Left)     Indwelling Lines/Drains at time of discharge:   Lines/Drains/Airways       None                   Hospital Course: Mr. Bobo presented with left ureteral obstruction. He underwent left ureteroscopy with laser lithotripsy and ureteral stent placement. He was observed overnight and discharged to home in satisfactory condition. The patient will retain a ureteral stent temporarily.        Goals of Care Treatment Preferences:  Code Status: Full Code      Consults:   Consults (From admission, onward)          Status Ordering Provider     Inpatient consult to Urology  Once        Provider:  Godwin Sandoval MD    Completed BRENDAN BENÍTEZ            Significant Diagnostic Studies: Ureterogram demonstrated obstruction    Pending Diagnostic Studies:       None             Final Active Diagnoses:      Problems Resolved During this Admission:    Diagnosis Date Noted Date Resolved POA    PRINCIPAL PROBLEM:  Ureteral stone with hydronephrosis [N13.2] 11/19/2024 11/20/2024 Yes    Renal colic on left side [N23] 11/19/2024 11/20/2024 Yes    Hydronephrosis of left kidney [N13.30] 11/19/2024 11/20/2024 Yes         Discharged Condition: good    Disposition:     Follow Up:   Follow-up Information       Godwin Cameron MD Follow up on 11/27/2024.    Specialty: Urology  Why: Stent removal on Wed Nov 27th at [facility]  Contact information:  [address] [facility] [address]  [phone]                           Patient Instructions: Stay active and well hydrated. Drink 2 to 3 liters of water per day. Reduce intake of animal proteins and salt and increase intake of fresh fruits and vegetables and especially Citrus. Incorporate citrus fruits into your daily routine 2 to 3 times per day if possible.   1  Medications:  Reconciled Home Medications:      Medication List        START taking these medications      ibuprofen 800 MG tablet  Commonly known as: ADVIL,MOTRIN  Take 800mg every 8 hours with a full meal for 5 days to help with inflammation.     ondansetron 4 MG Tbdl  Commonly known as: ZOFRAN-ODT  Take 1 tablet (4 mg total) by mouth every 12 (twelve) hours as needed (nausea).     oxyCODONE-acetaminophen 5-325 mg per tablet  Commonly known as: PERCOCET  Take 1 tablet by mouth every 6 (six) hours as needed for Pain.     phenazopyridine 200 MG tablet  Commonly known as: PYRIDIUM  Take 1 tablet (200 mg total) by mouth 3 (three) times daily with meals. for 3 days     senna-docusate 8.6-50 mg 8.6-50 mg per tablet  Commonly known as: SENNA WITH DOCUSATE SODIUM  Take 2 tablets by mouth once daily.     tamsulosin 0.4 mg Cap  Commonly known as: FLOMAX  Take 1 capsule (0.4 mg total) by mouth once daily. for 20 days            CONTINUE taking these medications      melatonin 5 mg Cap  Take 1  capsule by mouth nightly as needed.     mirtazapine 7.5 MG Tab  Commonly known as: REMERON  Take 7.5 mg by mouth every evening.     sildenafiL 25 MG tablet  Commonly known as: VIAGRA  Take 25 mg by mouth.     sumatriptan 25 MG Tab  Commonly known as: IMITREX  Take 25 mg by mouth.     VITAMIN B-12 500 MCG tablet  Generic drug: cyanocobalamin  Take 500 mcg by mouth once daily.     VITAMIN B-6 25 MG Tab  Generic drug: pyridoxine (vitamin B6)  Take 25 mg by mouth once daily.     zinc gluconate 50 mg tablet  Take 50 mg by mouth once daily.              Time spent on the discharge of patient: 20 minutes    ANGIE SANDOVAL MD  Urology  [facility] - [facility]

## 2024-11-20 NOTE — OP NOTE
DATE OF OPERATION: 2024     PREOPERATIVE DIAGNOSIS: Left distal ureteral calculus.     POSTOPERATIVE DIAGNOSIS: Same       OPERATIONS PERFORMED:  Left Ureteroscopy with holmium laser lithotripsy   Retrograde Pyelography performance, supervision and interpretation  Placement of double-J stent 6fr Cook Soft 22/32cm     SURGEON: Gowdin Cameron     ASSISTANT: Jose Poole     EBL: Trace      SPECIMENS: None      ANESTHESIA: General          DESCRIPTION OF OPERATION: The patient was brought to the operating room and underwent induction of general anesthesia without complications. He was positioned in the cystolithotomy position and prepped and draped. Cystoscopy was performed.  The urethra was normal. The bladder mucosa was normal. The ureteral orifices were orthotopic and normal. The stone was  in the left ureteral orifice. Semi-rigid ureteroscopy was then performed.  A wire was passed under vision along side the stone and advanced to the proximal ureter.  The ureteroscope was then readvanced into the ureter. The stone was too large to basket and was pulverized. Using low wattage laser lithotripsy the stone was fragmented. The stone fragments were removed. The ureteroscope was advanced to the proximal ureter. There were no stone fragments. The ureteroscope was then removed and upon withdrawal the ureter was inspected. There were no stone fragments.  A retrograde pyelogram was performed.  There was pelviectasis. The wire was converted into a Cook Soft 6fr double-J ureteral stent 22cm/32cm in length. The stent was seen curled in good position in the renal pelvis on fluoroscopy and in the bladder on cystoscopy. The bladder was drained. 10ml of 2% viscous lidocaine was instilled per urethra to help with comfort post procedure. The patient was awakened and brought to the PACU in satisfactory condition. He tolerated the procedure well. Plan to maintain the stent in place for one week and have him return to  the clinic for stent removal in the office.      Complications: None

## 2024-11-20 NOTE — TELEPHONE ENCOUNTER
I received a message that patient wants Rx for Senna sent to [facility]/t [facility] pharmacy don't have it. I told call center that Dr Cameron will send medication asap but it will probably be latter today.

## 2024-11-20 NOTE — OR NURSING
2123 PT REC'D TO PACU. VSS. SATS 100% ON RA. DENIES PAIN AT THIS TIME. NO DRAINAGE NOTED. WILL CONT TO MONITOR.    2200 TRANSFERRED TO [facility] IN STABLE CONDITION. REPORT GIVEN TO TESSA RAND. /83 HR 66 O2 % ON RA TEMP 98.0 ORAL. SURGICAL SITES ADDRESSED. RN AT BEDSIDE.

## 2024-11-20 NOTE — PLAN OF CARE
Problem: Adult Inpatient Plan of Care  Goal: Plan of Care Review  11/20/2024 0644 by Tiffanie Lord RN  Outcome: Met  11/20/2024 0210 by Tiffanie Lord RN  Outcome: Progressing  Goal: Patient-Specific Goal (Individualized)  11/20/2024 0644 by Tiffanie Lord RN  Outcome: Met  11/20/2024 0210 by Tiffanie Lord RN  Outcome: Progressing  Goal: Absence of Hospital-Acquired Illness or Injury  11/20/2024 0644 by Tiffanie Lord RN  Outcome: Met  11/20/2024 0210 by Tiffanie Lord RN  Outcome: Progressing  Goal: Optimal Comfort and Wellbeing  11/20/2024 0644 by Tiffanie Lord RN  Outcome: Met  11/20/2024 0210 by Tiffanie Lord RN  Outcome: Progressing  Goal: Readiness for Transition of Care  11/20/2024 0644 by Tiffanie Lord RN  Outcome: Met  11/20/2024 0210 by Tiffanie Lord RN  Outcome: Progressing     Problem: Pain Acute  Goal: Optimal Pain Control and Function  11/20/2024 0644 by Tiffanie Lord RN  Outcome: Met  11/20/2024 0210 by Tiffanie Lord RN  Outcome: Progressing

## 2024-11-20 NOTE — NURSING
Patient declined to be taken out by wheelchair. Patient received discharge instructions and follow up appointments. 20 G R AC removed. VSS. Patient chose to ambulate to vehicle without any transport assistance.

## 2024-11-22 NOTE — ANESTHESIA POSTPROCEDURE EVALUATION
Anesthesia Post Evaluation    Patient: Jack Bobo    Procedure(s) Performed: Procedure(s) (LRB):  CYSTOURETEROSCOPY, WITH HOLMIUM LASER LITHOTRIPSY OF URETERAL CALCULUS AND STENT INSERTION (Left)    Final Anesthesia Type: general      Patient location during evaluation: PACU  Patient participation: Yes- Able to Participate  Level of consciousness: awake and alert  Post-procedure vital signs: reviewed and stable  Pain management: adequate  Airway patency: patent  KEVIN mitigation strategies: Multimodal analgesia  PONV status at discharge: No PONV  Anesthetic complications: no      Cardiovascular status: blood pressure returned to baseline  Respiratory status: unassisted  Hydration status: euvolemic  Follow-up not needed.              Vitals Value Taken Time   /86 11/20/24 0715   Temp 36.4 °C (97.5 °F) 11/20/24 0715   Pulse 65 11/20/24 0715   Resp 20 11/20/24 0715   SpO2 98 % 11/20/24 0715         Event Time   Out of Recovery 21:53:00         Pain/Chevy Score: No data recorded

## 2024-11-27 ENCOUNTER — PROCEDURE VISIT (OUTPATIENT)
Dept: UROLOGY | Facility: CLINIC | Age: 39
End: 2024-11-27
Payer: OTHER GOVERNMENT

## 2024-11-27 VITALS
DIASTOLIC BLOOD PRESSURE: 78 MMHG | BODY MASS INDEX: 25.6 KG/M2 | WEIGHT: 189 LBS | SYSTOLIC BLOOD PRESSURE: 132 MMHG | HEART RATE: 76 BPM | HEIGHT: 72 IN | TEMPERATURE: 98 F | OXYGEN SATURATION: 100 %

## 2024-11-27 DIAGNOSIS — Z96.0 URETERAL STENT PRESENT: Primary | ICD-10-CM

## 2024-11-27 PROCEDURE — 52310 CYSTOSCOPY AND TREATMENT: CPT | Mod: PBBFAC | Performed by: UROLOGY

## 2024-11-27 RX ORDER — AMOXICILLIN AND CLAVULANATE POTASSIUM 875; 125 MG/1; MG/1
1 TABLET, FILM COATED ORAL
Status: COMPLETED | OUTPATIENT
Start: 2024-11-27 | End: 2024-11-27

## 2024-11-27 RX ORDER — LIDOCAINE HYDROCHLORIDE 20 MG/ML
JELLY TOPICAL
Status: COMPLETED | OUTPATIENT
Start: 2024-11-27 | End: 2024-11-27

## 2024-11-27 RX ADMIN — AMOXICILLIN AND CLAVULANATE POTASSIUM 1 TABLET: 875; 125 TABLET, FILM COATED ORAL at 09:11

## 2024-11-27 RX ADMIN — LIDOCAINE HYDROCHLORIDE 10 ML: 20 JELLY TOPICAL at 09:11

## 2024-11-27 NOTE — PROCEDURES
Office Cystoscopy Procedure Note    Date of Procedure: 11/27/2024    Indication:   Ureteral stent placement       Informed consent:  The risks, benefits, complications, treatment options, and expected outcomes were discussed with the patient. The patient concurred with the proposed plan and provided informed consent.     Anesthesia: Lidocaine jelly 2%          Procedure:  The patient was placed in the lithotomy position, was prepped and draped in the usual manner using sterile technique, and 2% lidocaine jelly instilled into the urethra.  A 17 F flexible cystoscope was then inserted into the urethra and the urethra and bladder carefully examined.  The following findings were noted:       Findings:   Urethra:  Normal    Prostate:  Normal    Bladder:  Normal      Other findings:     The ureteral stent was grasped and removed intact.        Specimens: None                   Complications: None; patient tolerated the procedure well            Disposition: Home after brief observation     Antibiotic prophylaxis:  Augmentin    Condition: Stable     Plan:  The patient was instructed to remain well hydrated and improve his dietary routine.  Follow-up in 3 months if metabolic testing is desired.           Godwin Cameron MD

## 2024-12-12 ENCOUNTER — HOSPITAL ENCOUNTER (OUTPATIENT)
Dept: RADIOLOGY | Facility: HOSPITAL | Age: 39
Discharge: HOME | End: 2024-12-12
Attending: NURSE PRACTITIONER
Payer: OTHER GOVERNMENT

## 2024-12-12 DIAGNOSIS — M79.89 MASS OF SOFT TISSUE OF NECK: ICD-10-CM

## 2024-12-12 PROCEDURE — 76536 US EXAM OF HEAD AND NECK: CPT | Mod: 26,,, | Performed by: RADIOLOGY

## 2024-12-12 PROCEDURE — 76536 US EXAM OF HEAD AND NECK: CPT | Mod: TC

## 2025-05-16 ENCOUNTER — HOSPITAL ENCOUNTER (OUTPATIENT)
Dept: RADIOLOGY | Facility: HOSPITAL | Age: 40
Discharge: HOME | End: 2025-05-16
Attending: NURSE PRACTITIONER
Payer: OTHER GOVERNMENT

## 2025-05-16 DIAGNOSIS — R10.9 LT FLANK PAIN: ICD-10-CM

## 2025-05-16 PROCEDURE — 74176 CT ABD & PELVIS W/O CONTRAST: CPT | Mod: TC

## 2025-05-16 PROCEDURE — 74176 CT ABD & PELVIS W/O CONTRAST: CPT | Mod: 26,,, | Performed by: RADIOLOGY

## (undated) DEVICE — KIT LITHOTOMY RUSH

## (undated) DEVICE — GLOVE SENSICARE PI SURG 8

## (undated) DEVICE — CANISTER SUCTION JUMBO 12L

## (undated) DEVICE — SET IRRI FLUID WARMING 300MMHG

## (undated) DEVICE — GUIDEWIRE ZIPWIRE STR .038 150

## (undated) DEVICE — GOWN NONREINF SET-IN SLV 2XL

## (undated) DEVICE — SOL NACL IRR 3000ML

## (undated) DEVICE — CATH URTRL OPEN END STR TIP 5F

## (undated) DEVICE — BOWL STERILE LARGE 32OZ

## (undated) DEVICE — SYR 30CC LUER LOCK

## (undated) DEVICE — FIBER MOSES 365 DFL

## (undated) DEVICE — KIT IRRIGATION PUMP SGL ACTION

## (undated) DEVICE — CONTAINER SPECIMEN OR STER 4OZ

## (undated) DEVICE — GLOVE SENSICARE PI SURG 7.5

## (undated) DEVICE — SYR 50ML CATH TIP

## (undated) DEVICE — SOL NACL IRR 1000ML BTL

## (undated) DEVICE — GUIDE WIRE SENSOR .035 150

## (undated) DEVICE — EXTRACTOR STONE 1.5FR X 115 CM